# Patient Record
Sex: FEMALE | Race: WHITE
[De-identification: names, ages, dates, MRNs, and addresses within clinical notes are randomized per-mention and may not be internally consistent; named-entity substitution may affect disease eponyms.]

---

## 2017-09-26 ENCOUNTER — HOSPITAL ENCOUNTER (OUTPATIENT)
Dept: HOSPITAL 50 - VM.SDS | Age: 62
Discharge: HOME | End: 2017-09-26
Attending: SURGERY
Payer: COMMERCIAL

## 2017-09-26 VITALS — DIASTOLIC BLOOD PRESSURE: 110 MMHG | SYSTOLIC BLOOD PRESSURE: 179 MMHG

## 2017-09-26 DIAGNOSIS — Q43.8: ICD-10-CM

## 2017-09-26 DIAGNOSIS — Z79.82: ICD-10-CM

## 2017-09-26 DIAGNOSIS — K56.69: ICD-10-CM

## 2017-09-26 DIAGNOSIS — I10: ICD-10-CM

## 2017-09-26 DIAGNOSIS — Z79.899: ICD-10-CM

## 2017-09-26 DIAGNOSIS — K57.30: ICD-10-CM

## 2017-09-26 DIAGNOSIS — Z87.891: ICD-10-CM

## 2017-09-26 DIAGNOSIS — E78.5: ICD-10-CM

## 2017-09-26 DIAGNOSIS — Z12.11: Primary | ICD-10-CM

## 2017-09-26 DIAGNOSIS — Z86.010: ICD-10-CM

## 2017-09-26 DIAGNOSIS — Z88.0: ICD-10-CM

## 2017-09-26 DIAGNOSIS — Z98.890: ICD-10-CM

## 2017-09-26 DIAGNOSIS — Z90.710: ICD-10-CM

## 2017-09-26 DIAGNOSIS — E66.9: ICD-10-CM

## 2017-09-26 PROCEDURE — 45378 DIAGNOSTIC COLONOSCOPY: CPT

## 2017-09-26 NOTE — OR
PREOPERATIVE DIAGNOSIS:  History of polyps.

 

POSTOPERATIVE DIAGNOSIS:  Extensive sigmoid diverticular disease with inability

to pass scope beyond 35 to 40 cm.

 

PROCEDURE PROPOSED:  Total flexible colonoscopy.

 

PROCEDURE DONE:  Limited partial colonoscopy due to sigmoid partial obstruction.

 

INDICATION:  This is a 62-year-old female, who comes in for colonic surveillance

due to history of polyps.  She does supposedly have a known tortuous colon,

apparently was uncomfortable for about 6 months after her last colonoscopy.  I

do not have that dictation.  It was felt that she needs a followup exam due to

her history of polyps.  Last examination was 5 years ago.

 

TECHNIQUE:  The patient was brought to the endoscopy suite, placed in left

lateral decubitus position.  She was sedated per CRNA with propofol.  The

flexible video colonoscope was then passed transanally under visualization

advanced to about 35 to 40 cm, where there was seen to be extensive tortuosity

with diverticular disease.  The bowel seemed to be very fixed in location.  It

was not pliable or movable and I was unable to get beyond that particular point

after numerous repositioning and attempts.  It was noted that she was getting

quite bloated due to likely air getting pushed up beyond the partial obstruction

and then unable to decompress it.  I therefore aborted at that point and brought

the scope back out, no polyps were noted throughout that part of the exam, and I

do feel that we should get a barium enema in followup.

 

FINAL IMPRESSION:  Extensive sigmoid diverticular disease with tortuosity,

inability to pass scope beyond 35 cm.

 

PLAN:  Barium enema next Monday when x-rays here for further evaluation of the

extent of her problem and to get a look at the remainder of the colon.

In the future, she may benefit with air-contrast barium enemas, every 5 years

rather than colonoscopies.

 

 

SCM:  09/26/2017 09:38:03  MODL:  09/26/2017 13:06:03

Job #:  [id number]

## 2017-10-04 NOTE — LETTER
10/04/2017

 

Belkys Maxwell

 

RE:  BELKYS MAXWELL :  1955

 

Dear Belkys,

 

I reviewed the barium enema that you had done recently.  There were no signs of

any polyps or masses in the remainder of your colon, that I was unable to

visualize.  You do have diverticulosis and some tortuosity in that area, which

made your examination difficult.  At this point, you basically have nothing to

worry about.  You should continue to have your colon evaluated every 5 years.

The main question is whether you should have attempted colonoscopies or just

proceed with barium enema.  You are at an increased risk of bowel perforation

with colonoscopy due to your sharp angulations and it may be safer to do a

barium enema.  If something would be seen on the barium enema, then one would

have to consider attempting a colonoscopy to visualize the abnormality.  If you

have further questions regarding this, feel free to call.

 

Respectfully,

## 2020-03-01 ENCOUNTER — HOSPITAL ENCOUNTER (EMERGENCY)
Dept: HOSPITAL 50 - VM.ED | Age: 65
Discharge: HOME | End: 2020-03-01
Payer: COMMERCIAL

## 2020-03-01 VITALS — HEART RATE: 105 BPM

## 2020-03-01 VITALS — DIASTOLIC BLOOD PRESSURE: 104 MMHG | SYSTOLIC BLOOD PRESSURE: 199 MMHG
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
## 2020-03-01 DIAGNOSIS — Z88.8: ICD-10-CM

## 2020-03-01 DIAGNOSIS — T20.24XA: ICD-10-CM

## 2020-03-01 DIAGNOSIS — Z88.0: ICD-10-CM

## 2020-03-01 DIAGNOSIS — T20.22XA: Primary | ICD-10-CM

## 2020-03-01 DIAGNOSIS — E66.9: ICD-10-CM

## 2020-03-01 DIAGNOSIS — I10: ICD-10-CM

## 2020-03-01 PROCEDURE — 99283 EMERGENCY DEPT VISIT LOW MDM: CPT

## 2020-03-01 PROCEDURE — 16020 DRESS/DEBRID P-THICK BURN S: CPT
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
## 2023-03-04 ENCOUNTER — HOSPITAL ENCOUNTER (EMERGENCY)
Dept: HOSPITAL 50 - VM.ED | Age: 68
Discharge: HOME | End: 2023-03-04
Payer: MEDICARE

## 2023-03-04 VITALS — HEART RATE: 72 BPM | SYSTOLIC BLOOD PRESSURE: 190 MMHG | DIASTOLIC BLOOD PRESSURE: 90 MMHG

## 2023-03-04 DIAGNOSIS — Z88.0: ICD-10-CM

## 2023-03-04 DIAGNOSIS — E66.9: ICD-10-CM

## 2023-03-04 DIAGNOSIS — N13.2: Primary | ICD-10-CM

## 2023-03-04 DIAGNOSIS — Z88.8: ICD-10-CM

## 2023-03-04 DIAGNOSIS — Z87.891: ICD-10-CM

## 2023-03-04 DIAGNOSIS — I10: ICD-10-CM

## 2023-03-04 DIAGNOSIS — Z79.899: ICD-10-CM

## 2023-03-04 LAB
ANION GAP SERPL CALC-SCNC: 14.4 MMOL/L (ref 5–15)
CHLORIDE SERPL-SCNC: 102 MMOL/L (ref 98–107)
EGFRCR SERPLBLD CKD-EPI 2021: 71 ML/MIN (ref 60–?)
SODIUM SERPL-SCNC: 139 MMOL/L (ref 136–145)

## 2023-03-06 ENCOUNTER — HOSPITAL ENCOUNTER (EMERGENCY)
Dept: HOSPITAL 50 - VM.ED | Age: 68
Discharge: HOME | End: 2023-03-06
Payer: MEDICARE

## 2023-03-06 VITALS — DIASTOLIC BLOOD PRESSURE: 78 MMHG | SYSTOLIC BLOOD PRESSURE: 150 MMHG | HEART RATE: 91 BPM

## 2023-03-06 DIAGNOSIS — E66.9: ICD-10-CM

## 2023-03-06 DIAGNOSIS — Z88.0: ICD-10-CM

## 2023-03-06 DIAGNOSIS — Z88.8: ICD-10-CM

## 2023-03-06 DIAGNOSIS — N20.1: Primary | ICD-10-CM

## 2023-03-06 DIAGNOSIS — I10: ICD-10-CM
